# Patient Record
Sex: FEMALE | Race: WHITE
[De-identification: names, ages, dates, MRNs, and addresses within clinical notes are randomized per-mention and may not be internally consistent; named-entity substitution may affect disease eponyms.]

---

## 2017-08-21 ENCOUNTER — HOSPITAL ENCOUNTER (EMERGENCY)
Dept: HOSPITAL 80 - CED | Age: 42
Discharge: HOME | End: 2017-08-21
Payer: MEDICAID

## 2017-08-21 VITALS
HEART RATE: 65 BPM | OXYGEN SATURATION: 98 % | DIASTOLIC BLOOD PRESSURE: 100 MMHG | SYSTOLIC BLOOD PRESSURE: 140 MMHG | TEMPERATURE: 97.9 F | RESPIRATION RATE: 16 BRPM

## 2017-08-21 DIAGNOSIS — I10: ICD-10-CM

## 2017-08-21 DIAGNOSIS — B96.89: ICD-10-CM

## 2017-08-21 DIAGNOSIS — N30.00: Primary | ICD-10-CM

## 2017-08-21 LAB
BACTERIA #/AREA URNS HPF: (no result) /HPF
COLOR UR: (no result)
MUCOUS THREADS #/AREA URNS LPF: (no result) /LPF
NITRITE UR QL STRIP: NEGATIVE
PH UR STRIP: 7 [PH] (ref 5–7.5)
RBC #/AREA URNS HPF: (no result) /HPF (ref 0–3)
SP GR UR STRIP: 1.01 (ref 1–1.03)
WBC #/AREA URNS HPF: (no result) /HPF (ref 0–3)

## 2017-08-21 NOTE — EDPHY
H & P


Stated Complaint: Painful Urination for several hours.


Time Seen by Provider: 08/21/17 20:10


HPI/ROS: 





CHIEF COMPLAINT:  Dysuria and frequency





HISTORY OF PRESENT ILLNESS:  The patient is a 42-year-old female who complains 

of dysuria with burning and frequency over the last 6 hours.  She states that 

this is exactly similar to previous urinary tract infections.  She has not had 

a fever.  No flank pain.  No nausea vomiting.  No diarrhea.  She denies risk of 

pregnancy.  She has a history of appendectomy.  No abdominal pain.








REVIEW OF SYSTEMS:


Constitutional:  denies: chills, fever, recent illness, recent injury


EENTM: denies: blurred vision, double vision, nose congestion


Respiratory: denies: cough, shortness of breath


Cardiac: denies: chest pain, irregular heart rate, lightheadedness, palpitations


Gastrointestinal/Abdominal: denies: abdominal pain, diarrhea, nausea, vomiting, 

blood streaked stools


Genitourinary:  See HPI


Musculoskeletal: denies: joint pain, muscle pain


Skin: denies: lesions, rash, jaundice, bruising


Neurological: denies: headache, numbness, paresthesia, tingling, dizziness, 

weakness


Hematologic/Lymphatic: denies: blood clots, easy bleeding, easy bruising


Immunologic/allergic: denies: HIV/AIDS, transplant








EXAM:


GENERAL:  Well-appearing, well-nourished and in no acute distress.


HEAD:  Atraumatic, normocephalic.


EYES:  Pupils equal round and reactive to light, extraocular movements intact, 

sclera anicteric, conjunctiva are normal.


ENT:  TMs normal, nares patent, oropharynx clear without exudates.  Moist 

mucous membranes.


NECK:  Normal range of motion, supple without lymphadenopathy or JVD.


LUNGS:  Breath sounds clear to auscultation bilaterally and equal.  No wheezes 

rales or rhonchi.


HEART:  Regular rate and rhythm without murmurs, rubs or gallops.


ABDOMEN:  Soft, nontender, normoactive bowel sounds.  No guarding, no rebound.  

No masses appreciated.


BACK:  No CVA tenderness, no spinal tenderness, step-offs or deformities


EXTREMITIES:  Normal range of motion, no pitting or edema.  No clubbing or 

cyanosis.


NEUROLOGICAL:  Cranial nerves II through XII grossly intact.  Normal speech, 

normal gait.  5/5 strength, normal movement in all extremities, normal sensation


PSYCH:  Normal mood, normal affect.


SKIN:  Warm, dry, normal turgor, no visible rashes or lesions.








Source: Patient


Exam Limitations: No limitations





- Personal History


LMP (Females 10-55): 15-21 Days Ago


Current Tetanus/Diphtheria Vaccine: Yes


Current Tetanus Diphtheria and Acellular Pertussis (TDAP): Yes


Tetanus Vaccine Date: 2013





- Medical/Surgical History


Hx Asthma: No


Hx Chronic Respiratory Disease: No


Hx Diabetes: No


Hx Cardiac Disease: No


Hx Renal Disease: No


Hx Cirrhosis: No


Hx Alcoholism: No


Hx HIV/AIDS: No


Hx Splenectomy or Spleen Trauma: No


Other PMH: HTN/ ocular migraines





- Family History


Significant Family History: No pertinent family hx





- Social History


Smoking Status: Never smoked


Alcohol Use: Sober


Drug Use: None


Constitutional: 


 Initial Vital Signs











Temperature (C)  36.6 C   08/21/17 20:06


 


Heart Rate  65   08/21/17 20:06


 


Respiratory Rate  16   08/21/17 20:06


 


Blood Pressure  140/100 H  08/21/17 20:06


 


O2 Sat (%)  98   08/21/17 20:06








 











O2 Delivery Mode               Room Air














Allergies/Adverse Reactions: 


 





sulfamethoxazole [From Bactrim] Allergy (Verified 08/21/17 20:08)


 


trimethoprim [From Bactrim] Allergy (Verified 08/21/17 20:08)


 








Home Medications: 














 Medication  Instructions  Recorded


 


Zoloft Unk Dose  11/05/15


 


Propranolol HCl  01/01/16


 


Fluticasone Nasal [Flonase Nasal 1 sprays NASAL DAILY #1 mdi 12/13/16





Spray (RX)]  


 


Cephalexin [Keflex] 500 mg PO TID #21 cap 08/21/17


 


Fluconazole [Diflucan (*)] 150 mg PO ONCE #3 tab 08/21/17


 


Phenazopyridine HCl [Pyridium] 200 mg PO TID #6 tab 08/21/17














Medical Decision Making


ED Course/Re-evaluation: 





I will treat the patient with Keflex and she is requesting Pyridium and 

Diflucan.  She states that she is allergic to Bactrim.  She is happy with this 

plan and declines further workup or testing.  We discussed indications for 

returning.


Differential Diagnosis: 





Partial list of the Differential diagnosis considered include but were not 

limited to;  urinary tract infection, pyelonephritis and although unlikely 

based on the history and physical exam, I also considered yeast infection, 

pregnancy, sepsis.  I discussed these differential diagnoses and the plan with 

the patient as well as the usual and expected course.  The patient understands 

that the diagnosis is provisional and that in medicine we are not always 

correct and that further workup is often warranted.  Usual and customary 

warnings were given.  All of the patient's questions were answered.  The 

patient was instructed to return to the emergency department should the 

symptoms at all worsen or return, otherwise to followup with the physician as 

we discussed.





- Data Points


Laboratory Results: 


 











  08/21/17





  20:07


 


Urine Color  PALE YELLOW 





  


 


Urine Appearance  CLEAR 





  


 


Urine pH  7.0 





   (5.0-7.5) 


 


Ur Specific Gravity  1.010 





   (1.002-1.030) 


 


Urine Protein  NEGATIVE 





   (NEGATIVE) 


 


Urine Ketones  NEGATIVE 





   (NEGATIVE) 


 


Urine Blood  NEGATIVE 





   (NEGATIVE) 


 


Urine Nitrate  NEGATIVE 





   (NEGATIVE) 


 


Urine Bilirubin  NEGATIVE 





   (NEGATIVE) 


 


Urine Urobilinogen  0.2 EU EU





   (0.2-1.0) 


 


Ur Leukocyte Esterase  NEGATIVE 





   (NEGATIVE) 


 


Urine RBC  NONE SEEN /hpf /hpf





   (0-3) 


 


Urine WBC  OCCASIONAL /hpf /hpf





   (0-3) 


 


Ur Epithelial Cells  1+ /lpf /lpf





   (NONE-1+) 


 


Urine Bacteria  1+ /hpf H /hpf





   (NONE SEEN) 


 


Urine Mucus  TRACE /lpf /lpf





   (NONE-1+) 


 


Urine Glucose  NEGATIVE 





   (NEGATIVE) 











Medications Given: 


 








Discontinued Medications





Cephalexin HCl (Keflex)  500 mg PO EDNOW ONE


   PRN Reason: Protocol


   Stop: 08/21/17 20:18


   Last Admin: 08/21/17 20:23 Dose:  500 mg


Phenazopyridine HCl (Pyridium)  200 mg PO EDNOW ONE


   Stop: 08/21/17 20:18


   Last Admin: 08/21/17 20:23 Dose:  200 mg








Departure





- Departure


Disposition: Home, Routine, Self-Care


Clinical Impression: 


Urinary tract infection


Qualifiers:


 Urinary tract infection type: acute cystitis Hematuria presence: without 

hematuria Qualified Code(s): N30.00 - Acute cystitis without hematuria





Condition: Fair


Instructions:  Urinary Tract Infection in Women (ED)


Referrals: 


LORY BURROUGHS,Eduar [Primary Care Provider] - As per Instructions


Prescriptions: 


Cephalexin [Keflex] 500 mg PO TID #21 cap


Fluconazole [Diflucan (*)] 150 mg PO ONCE #3 tab


Phenazopyridine HCl [Pyridium] 200 mg PO TID #6 tab

## 2017-09-25 ENCOUNTER — HOSPITAL ENCOUNTER (EMERGENCY)
Dept: HOSPITAL 80 - CED | Age: 42
Discharge: HOME | End: 2017-09-25
Payer: MEDICAID

## 2017-09-25 VITALS
TEMPERATURE: 101.1 F | DIASTOLIC BLOOD PRESSURE: 79 MMHG | OXYGEN SATURATION: 96 % | SYSTOLIC BLOOD PRESSURE: 114 MMHG | HEART RATE: 110 BPM

## 2017-09-25 VITALS — RESPIRATION RATE: 16 BRPM

## 2017-09-25 DIAGNOSIS — I10: ICD-10-CM

## 2017-09-25 DIAGNOSIS — R73.9: ICD-10-CM

## 2017-09-25 DIAGNOSIS — E86.0: ICD-10-CM

## 2017-09-25 DIAGNOSIS — K52.9: Primary | ICD-10-CM

## 2017-09-25 LAB
% IMMATURE GRANULYOCYTES: 0.3 % (ref 0–1.1)
ABSOLUTE IMMATURE GRANULOCYTES: 0.04 10^3/UL (ref 0–0.1)
ABSOLUTE NRBC COUNT: 0 10^3/UL (ref 0–0.01)
ADD DIFF?: NO
ADD MORPH?: NO
ADD SCAN?: NO
ANION GAP SERPL CALC-SCNC: 14 MEQ/L (ref 8–16)
ATYPICAL LYMPHOCYTE FLAG: 0 (ref 0–99)
CALCIUM SERPL-MCNC: 9.1 MG/DL (ref 8.5–10.4)
CHLORIDE SERPL-SCNC: 104 MEQ/L (ref 97–110)
CO2 SERPL-SCNC: 22 MEQ/L (ref 22–31)
COLOR UR: YELLOW
CREAT SERPL-MCNC: 0.6 MG/DL (ref 0.6–1)
ERYTHROCYTE [DISTWIDTH] IN BLOOD BY AUTOMATED COUNT: 12.7 % (ref 11.5–15.2)
FRAGMENT RBC FLAG: 0 (ref 0–99)
GFR SERPL CREATININE-BSD FRML MDRD: > 60 ML/MIN/{1.73_M2}
GLUCOSE SERPL-MCNC: 174 MG/DL (ref 70–100)
HCT VFR BLD CALC: 46 % (ref 38–47)
HGB BLD-MCNC: 15.6 G/DL (ref 12.6–16.3)
LEFT SHIFT FLG: 0 (ref 0–99)
LIPEMIA HEMOLYSIS FLAG: 90 (ref 0–99)
MCH RBC BLDCO QN: 27.6 PG (ref 27.9–34.1)
MCHC RBC AUTO-ENTMCNC: 33.9 G/DL (ref 32.4–36.7)
MCV RBC AUTO: 81.4 FL (ref 81.5–99.8)
NITRITE UR QL STRIP: NEGATIVE
NRBC-AUTO%: 0 % (ref 0–0.2)
PH UR STRIP: 6 [PH] (ref 5–7.5)
PLATELET # BLD: 170 10^3/UL (ref 150–400)
PLATELET CLUMPS FLAG: 0 (ref 0–99)
PMV BLD AUTO: 10.1 FL (ref 8.7–11.7)
POTASSIUM SERPL-SCNC: 3.8 MEQ/L (ref 3.5–5.2)
RBC # BLD AUTO: 5.65 10^6/UL (ref 4.18–5.33)
SODIUM SERPL-SCNC: 140 MEQ/L (ref 134–144)
SP GR UR STRIP: 1.01 (ref 1–1.03)

## 2017-09-25 NOTE — EDPHY
H & P


Stated Complaint: abd cramping, vomiting, "red liquid stools" x 5 hours


Time Seen by Provider: 09/25/17 06:58


HPI/ROS: 





This patient presents with vomiting diarrhea and crampy abdominal pain.  

Yesterday the patient had loose stools and mild abdominal cramping, but had 

midnight last night her symptoms significantly worsened with onset of 

associated vomiting.  She describes the appearance of of the stool as "pain ".  

She reports going every 15 minutes or so with associated vomiting with equal 

frequency.  No hematemesis or coffee-ground emesis.  She states that abdominal 

cramping is generalized in location and seem to come after the onset of 

vomiting and at times up to 8/10 in intensity, squeezing in nature with no 

exacerbating or alleviating factors.  She tried is Zofran at bedtime last night 

she had from a prior illness due to nausea but had vomiting despite this.  She 

also tried Phenergan suppository but had another episode of diarrhea within 20 

30 minutes so she does not think that she retained the Phenergan.  She has 

ongoing nausea and crampy pain currently.  She notes no other exacerbating or 

alleviating factors.  She drove herself by private vehicle for further 

evaluation but reports that she can get a ride home if needed.





ROS:  No high fevers or chills.  No other constitutional symptoms


HEENT:  Mild coryza no other URI symptoms currently.  No sore throat.


Pulmonary:  No cough for shortness of breath


Cardiovascular:  No chest pain or lightheadedness.  No heart palpitations


GI:  As per HPI.


:  No dysuria, frequency urgency or hematuria.  Last menstrual.  3 weeks ago-

normal timing for her.


Integumentary:  No skin rash


Endocrine:  No complaints


10 point ROS is otherwise negative.


Source: Patient


Exam Limitations: No limitations





- Personal History


LMP (Females 10-55): 22-28 Days Ago


Current Tetanus/Diphtheria Vaccine: Yes


Tetanus Vaccine Date: 2013





- Medical/Surgical History


Hx Asthma: No


Hx Chronic Respiratory Disease: No


Hx Diabetes: No


Hx Cardiac Disease: No


Hx Renal Disease: No


Hx Cirrhosis: No


Hx Alcoholism: No


Hx HIV/AIDS: No


Hx Splenectomy or Spleen Trauma: No


Other PMH: HTN/ ocular migraines.  c section.  appy.  Recent Keflex antibiotic 

for UTI within the past 2 weeks





- Family History


Significant Family History: No pertinent family hx





- Social History


Smoking Status: Never smoked


Alcohol Use: Rarely


Drug Use: None


Additional Social History: 





No recent foreign travel.  No suspect food ingestion.





Her children do have URIs currently.





- Physical Exam


Exam: 





General Appearance:  Alert, no distress.


Eyes:  Pupils equal and round no pallor or injection.


ENT, Mouth:  Mucous membranes dry.


Respiratory:  There are no retractions, lungs are clear to auscultation.


Cardiovascular:  Regular rate and rhythm.  No murmur gallop or rub.


Gastrointestinal:  Hypoactive bowel sounds with diffuse mild tenderness.  No 

guarding or rebound.  No organomegaly.


Back:  No CVA tenderness


Neurological:  GCS 15


Skin:  Warm and dry, no rashes.


Musculoskeletal:  Neck is supple nontender.


Extremities are symmetrical, full range of motion.


Psychiatric:  Mood and affect normal





DIFFERENTIAL DIAGNOSIS:  After history and physical exam differential diagnosis 

was considered for viral gastroenteritis, bacterial dysentery, parasitic 

dysentery, food intolerance


Constitutional: 


 Initial Vital Signs











Temperature (C)  37 C   09/25/17 07:02


 


Heart Rate  98   09/25/17 07:02


 


Respiratory Rate  20   09/25/17 07:02


 


Blood Pressure  115/95 H  09/25/17 07:02


 


O2 Sat (%)  99   09/25/17 07:02








 











O2 Delivery Mode               Room Air














Allergies/Adverse Reactions: 


 





sulfamethoxazole [From Bactrim] Allergy (Verified 09/25/17 07:05)


 


trimethoprim [From Bactrim] Allergy (Verified 09/25/17 07:05)


 








Home Medications: 














 Medication  Instructions  Recorded


 


Ondansetron Odt [Zofran Odt] 4 - 8 mg PO Q4PRN PRN #4 tab 09/25/17


 


Propranolol HCl  09/25/17


 


Sertraline HCl  09/25/17














Medical Decision Making


ED Course/Re-evaluation: 





IV normal saline bolus





Benadryl and Reglan IV





Patient was able to provide a small stool sample.





At 8:35 am -  pt with recurrent emesis - tx'd with IV Zofran and ativan with 

relief.





After 2 L normal saline bolus antiemetics, patient tolerates p.o. fluids.  She 

is feeling improved





Discussion:  Given patient's presentation prevalence of gastroenteritis in the 

community at this time for this patient likely has a viral gastroenteritis.  I 

counseled regarding this.  Food intolerance or colitis is another possibility.  

Doubt parasitic or bacterial pathogen.  Her belly exam is benign-she does not 

have a surgical abdomen.  Plan will be to go home on a bland diet with 

antiemetics





- Data Points


Laboratory Results: 


 Laboratory Results





 09/25/17 07:30 





 09/25/17 07:30 








Microbiology Results: 


 MICROBIOLOGY





09/25/17 07:45   Stool   Gastrointestinal Tract Panel (PCR) - Final


                            No Organism Detected





Medications Given: 


 








Discontinued Medications





Acetaminophen (Tylenol)  1,000 mg PO EDNOW ONE


   Stop: 09/25/17 10:04


   Last Admin: 09/25/17 10:06 Dose:  1,000 mg


Diphenhydramine HCl (Benadryl Injection)  25 mg IVP EDNOW ONE


   Stop: 09/25/17 07:21


   Last Admin: 09/25/17 07:31 Dose:  25 mg


Sodium Chloride (Ns)  1,000 mls @ 0 mls/hr IV EDNOW ONE; Wide Open


   PRN Reason: Protocol


   Stop: 09/25/17 07:00


   Last Admin: 09/25/17 07:30 Dose:  1,000 mls


Sodium Chloride (Ns)  1,000 mls @ 0 mls/hr IV EDNOW ONE; Wide Open


   PRN Reason: Protocol


   Stop: 09/25/17 08:40


   Last Admin: 09/25/17 08:45 Dose:  1,000 mls


Lorazepam (Ativan Injection)  0.5 mg IVP EDNOW ONE


   Stop: 09/25/17 08:40


   Last Admin: 09/25/17 08:48 Dose:  0.5 mg


Metoclopramide HCl (Reglan Injection)  5 mg IVP EDNOW ONE


   Stop: 09/25/17 07:22


   Last Admin: 09/25/17 07:30 Dose:  5 mg


Ondansetron HCl (Zofran)  4 mg IVP EDNOW ONE


   Stop: 09/25/17 08:40


   Last Admin: 09/25/17 08:46 Dose:  4 mg








Departure





- Departure


Disposition: Home, Routine, Self-Care


Clinical Impression: 


 Gastroenteritis, Dehydration, Hyperglycemia





Condition: Good


Instructions:  Gastroenteritis (ED)


Additional Instructions: 


Diagnosis:  1. Gastroenteritis 2. Dehydration 3.  Hyperglycemia





You likely have a viral illness causing your symptoms.   Lab tests should be 

back tomorrow that will clarify the specific cause based on the stool sample.





You have high blood sugar that may simply be a stress response to the illness.  

However you warrant follow-up to recheck your blood sugar when you are feeling 

better.





Plan:  Drink plenty fluids, light diet until you feel improved





Zofran for nausea or vomiting





Call primary care physician to arrange follow-up appointment for sometime 

within the next 3-7 days to recheck her blood sugar.





Return to the emergency department for any significant worsening despite the 

treatment plan.


Referrals: 


HEIKE HENLEY [Other] - As per Instructions


Prescriptions: 


Ondansetron Odt [Zofran Odt] 4 - 8 mg PO Q4PRN PRN #4 tab


 PRN Reason: Vomiting

## 2018-07-25 ENCOUNTER — HOSPITAL ENCOUNTER (EMERGENCY)
Dept: HOSPITAL 80 - CED | Age: 43
Discharge: HOME | End: 2018-07-25
Payer: MEDICAID

## 2018-07-25 VITALS — SYSTOLIC BLOOD PRESSURE: 120 MMHG | DIASTOLIC BLOOD PRESSURE: 93 MMHG

## 2018-07-25 DIAGNOSIS — T38.0X5A: ICD-10-CM

## 2018-07-25 DIAGNOSIS — Z87.891: ICD-10-CM

## 2018-07-25 DIAGNOSIS — I10: ICD-10-CM

## 2018-07-25 DIAGNOSIS — R00.2: Primary | ICD-10-CM

## 2018-07-25 NOTE — CPEKG
Heart Rate: 70

RR Interval: 857

P-R Interval: 144

QRSD Interval: 78

QT Interval: 380

QTC Interval: 410

P Axis: 51

QRS Axis: 38

T Wave Axis: 25

EKG Severity - NORMAL ECG -

EKG Impression: SINUS RHYTHM

Electronically Signed By: Zack Nunn 25-Jul-2018 17:15:33

## 2018-07-25 NOTE — EDPHY
H & P


Stated Complaint: heart palpitations


Time Seen by Provider: 07/25/18 17:11


HPI/ROS: 





CHIEF COMPLAINT:  Palpitations





HISTORY OF PRESENT ILLNESS:  The patient is a 42-year-old female who comes to 

the emergency department complaining of heart palpitations intermittently for 

the last 12 hr as well as a feeling of fatigue and some paresthesias in both 

hands.  She has a history of anxiety as well as migraines.  She reports having 

dental work done last week at which time she was injected with lidocaine and 

epinephrine.  She states that this sometimes makes her heart race.  2 days ago 

she was stung by a wasp and had a "steroid injection".  She has also been 

taking Benadryl since that time.  She states that steroids and Benadryl often 

make her have palpitations and feel fatigued.  She denies any chest pain or 

shortness of breath.  No nausea vomiting.  No diaphoresis.  No GI symptoms.  

She has been asymptomatic for the last 2 hr.








REVIEW OF SYSTEMS:


Constitutional:  denies: chills, fever, recent illness, recent injury


EENTM: denies: blurred vision, double vision, nose congestion


Respiratory: denies: cough, shortness of breath


Cardiac: denies: chest pain, irregular heart rate, lightheadedness, palpitations


Gastrointestinal/Abdominal: denies: abdominal pain, diarrhea, nausea, vomiting, 

blood streaked stools


Genitourinary: denies: dysuria, frequency, hematuria, pain


Musculoskeletal: denies: joint pain, muscle pain


Skin: denies: lesions, rash, jaundice, bruising


Neurological: denies: headache, numbness, paresthesia, tingling, dizziness, 

weakness


Hematologic/Lymphatic: denies: blood clots, easy bleeding, easy bruising


Immunologic/allergic: denies: HIV/AIDS, transplant








EXAM:


GENERAL:  Well-appearing, well-nourished and in no acute distress.


HEAD:  Atraumatic, normocephalic.


EYES:  Pupils equal round and reactive to light, extraocular movements intact, 

sclera anicteric, conjunctiva are normal.


ENT:  TMs normal, nares patent, oropharynx clear without exudates.  Moist 

mucous membranes.


NECK:  Normal range of motion, supple without lymphadenopathy or JVD.


LUNGS:  Breath sounds clear to auscultation bilaterally and equal.  No wheezes 

rales or rhonchi.


HEART:  Regular rate and rhythm without murmurs, rubs or gallops.


ABDOMEN:  Soft, nontender, normoactive bowel sounds.  No guarding, no rebound.  

No masses appreciated.


BACK:  No CVA tenderness, no spinal tenderness, step-offs or deformities


EXTREMITIES:  Normal range of motion, no pitting or edema.  No clubbing or 

cyanosis.


NEUROLOGICAL:  Cranial nerves II through XII grossly intact.  Normal speech, 

normal gait.  5/5 strength, normal movement in all extremities, normal sensation


PSYCH:  Normal mood, normal affect.


SKIN:  Warm, dry, normal turgor, no visible rashes or lesions.








Source: Patient


Exam Limitations: No limitations





- Personal History


LMP (Females 10-55): Unknown


Current Tetanus/Diphtheria Vaccine: Yes


Current Tetanus Diphtheria and Acellular Pertussis (TDAP): Yes


Tetanus Vaccine Date: 2013





- Medical/Surgical History


Hx Asthma: No


Hx Chronic Respiratory Disease: No


Hx Diabetes: No


Hx Cardiac Disease: No


Hx Renal Disease: No


Hx Cirrhosis: No


Hx Alcoholism: No


Hx HIV/AIDS: No


Hx Splenectomy or Spleen Trauma: No


Other PMH: Anxiety.  HTN/ ocular migraines.  c section.  appy.  Recent Keflex 

antibiotic for UTI within the past 2 weeks





- Social History


Smoking Status: Former smoker


Alcohol Use: None


Constitutional: 


 Initial Vital Signs











Temperature (C)  36.7 C   07/25/18 17:00


 


Heart Rate  73   07/25/18 17:00


 


Respiratory Rate  18   07/25/18 17:00


 


Blood Pressure  128/90 H  07/25/18 17:00


 


O2 Sat (%)  95   07/25/18 17:00








 











O2 Delivery Mode               Room Air














Allergies/Adverse Reactions: 


 





sulfamethoxazole [From Bactrim] Allergy (Verified 07/25/18 17:06)


 


trimethoprim [From Bactrim] Allergy (Verified 07/25/18 17:06)


 








Home Medications: 














 Medication  Instructions  Recorded


 


Ondansetron Odt [Zofran Odt] 4 - 8 mg PO Q4PRN PRN #4 tab 09/25/17


 


Propranolol HCl  09/25/17


 


Sertraline HCl  09/25/17














Medical Decision Making





- Diagnostics


EKG Interpretation: 





An EKG obtained and was read and documented in trace view.  Please see trace 

view for full reading and report.  Sinus rhythm, no acute ischemic changes 


ED Course/Re-evaluation: 





We had a long discussion engaged in shared decision making.  I offered further 

cardiac workup verses discharge at this point with follow up with her primary 

for Holter monitoring.  After discussion it seems relatively clear that her 

symptoms consistent with previous episodes of taking steroids and Benadryl.  

She declines blood work or lab testing at this point.  She is currently 

asymptomatic.  She promised to return if her symptoms worsened or returned.  

Her primary has offered her Holter monitor in the past which she may explore 

again if the symptoms return.


Differential Diagnosis: 





Partial list of the Differential diagnosis considered include but were not 

limited to;  palpitations, anxiety, medication reaction and although unlikely 

based on the history and physical exam, I also considered acute coronary disease

, fever, TIA, neck injury.  I discussed these differential diagnoses and the 

plan with the patient as well as the usual and expected course.  The patient 

understands that the diagnosis is provisional and that in medicine we are not 

always correct and that further workup is often warranted.  Usual and customary 

warnings were given.  All of the patient's questions were answered.  The 

patient was instructed to return to the emergency department should the 

symptoms at all worsen or return, otherwise to followup with the physician as 

we discussed.





Departure





- Departure


Disposition: Home, Routine, Self-Care


Clinical Impression: 


 Heart palpitations





Medication reaction


Qualifiers:


 Encounter type: initial encounter Qualified Code(s): T50.905A - Adverse effect 

of unspecified drugs, medicaments and biological substances, initial encounter





Condition: Fair


Instructions:  Heart Palpitations (ED)


Referrals: 


HEIKE HENLEY [Other] - 1-2 days without fail


ED,PHYSICIAN TELLO [Medical Doctor] - 1 day, if not improved